# Patient Record
Sex: FEMALE | Race: BLACK OR AFRICAN AMERICAN | Employment: UNEMPLOYED | URBAN - METROPOLITAN AREA
[De-identification: names, ages, dates, MRNs, and addresses within clinical notes are randomized per-mention and may not be internally consistent; named-entity substitution may affect disease eponyms.]

---

## 2017-04-04 ENCOUNTER — HOSPITAL ENCOUNTER (EMERGENCY)
Facility: HOSPITAL | Age: 22
Discharge: HOME | End: 2017-04-05
Attending: EMERGENCY MEDICINE
Payer: MEDICAID

## 2017-04-04 VITALS
DIASTOLIC BLOOD PRESSURE: 58 MMHG | BODY MASS INDEX: 38.07 KG/M2 | TEMPERATURE: 98 F | RESPIRATION RATE: 18 BRPM | HEIGHT: 64 IN | WEIGHT: 223 LBS | HEART RATE: 67 BPM | OXYGEN SATURATION: 100 % | SYSTOLIC BLOOD PRESSURE: 98 MMHG

## 2017-04-04 DIAGNOSIS — R10.13 ABDOMINAL PAIN, EPIGASTRIC: Primary | ICD-10-CM

## 2017-04-04 PROCEDURE — 96361 HYDRATE IV INFUSION ADD-ON: CPT

## 2017-04-04 PROCEDURE — 80076 HEPATIC FUNCTION PANEL: CPT | Performed by: EMERGENCY MEDICINE

## 2017-04-04 PROCEDURE — 85025 COMPLETE CBC W/AUTO DIFF WBC: CPT | Performed by: EMERGENCY MEDICINE

## 2017-04-04 PROCEDURE — 81001 URINALYSIS AUTO W/SCOPE: CPT | Performed by: EMERGENCY MEDICINE

## 2017-04-04 PROCEDURE — 80048 BASIC METABOLIC PNL TOTAL CA: CPT | Performed by: EMERGENCY MEDICINE

## 2017-04-04 PROCEDURE — 83690 ASSAY OF LIPASE: CPT | Performed by: EMERGENCY MEDICINE

## 2017-04-04 PROCEDURE — 81025 URINE PREGNANCY TEST: CPT

## 2017-04-04 PROCEDURE — 99284 EMERGENCY DEPT VISIT MOD MDM: CPT

## 2017-04-04 PROCEDURE — 96374 THER/PROPH/DIAG INJ IV PUSH: CPT

## 2017-04-04 RX ORDER — ONDANSETRON 2 MG/ML
4 INJECTION INTRAMUSCULAR; INTRAVENOUS ONCE
Status: COMPLETED | OUTPATIENT
Start: 2017-04-04 | End: 2017-04-04

## 2017-04-04 RX ORDER — MAGNESIUM HYDROXIDE/ALUMINUM HYDROXICE/SIMETHICONE 120; 1200; 1200 MG/30ML; MG/30ML; MG/30ML
30 SUSPENSION ORAL ONCE
Status: COMPLETED | OUTPATIENT
Start: 2017-04-04 | End: 2017-04-04

## 2017-04-04 RX ORDER — KETOROLAC TROMETHAMINE 30 MG/ML
30 INJECTION, SOLUTION INTRAMUSCULAR; INTRAVENOUS ONCE
Status: DISCONTINUED | OUTPATIENT
Start: 2017-04-04 | End: 2017-04-04

## 2017-04-04 RX ORDER — ONDANSETRON 2 MG/ML
4 INJECTION INTRAMUSCULAR; INTRAVENOUS ONCE
Status: DISCONTINUED | OUTPATIENT
Start: 2017-04-04 | End: 2017-04-04

## 2017-04-05 RX ORDER — RANITIDINE 150 MG/1
150 TABLET ORAL EVERY 12 HOURS
Qty: 60 TABLET | Refills: 0 | Status: SHIPPED | OUTPATIENT
Start: 2017-04-05 | End: 2017-05-05

## 2017-04-05 NOTE — ED PROVIDER NOTES
Patient Seen in: [facility] AND [facility] Emergency Department    History   Patient presents with:  Stomach Pain    Stated Complaint: stomach pain since september of last year    HPI    The patient is a 57-year-old female with a history of HIV who presents with oriented to person, place, and time. She appears well-developed and well-nourished. HENT:   Head: Normocephalic and atraumatic.    Mouth/Throat: Oropharynx is clear and moist and mucous membranes are normal. No oropharyngeal exudate, posterior oropharynge Status                     ---------                               -----------         ------                     CBC W/ DIFFERENTIAL[[id number]]          Abnormal            Final result                 Please view results for these tests on the in

## 2017-04-17 ENCOUNTER — HOSPITAL ENCOUNTER (EMERGENCY)
Facility: HOSPITAL | Age: 22
Discharge: HOME | End: 2017-04-17
Payer: MEDICAID

## 2017-04-17 ENCOUNTER — APPOINTMENT (OUTPATIENT)
Dept: CT IMAGING | Facility: HOSPITAL | Age: 22
End: 2017-04-17
Attending: NURSE PRACTITIONER
Payer: MEDICAID

## 2017-04-17 VITALS
SYSTOLIC BLOOD PRESSURE: 103 MMHG | HEART RATE: 61 BPM | BODY MASS INDEX: 38.07 KG/M2 | TEMPERATURE: 97 F | DIASTOLIC BLOOD PRESSURE: 47 MMHG | WEIGHT: 223 LBS | RESPIRATION RATE: 18 BRPM | HEIGHT: 64 IN | OXYGEN SATURATION: 100 %

## 2017-04-17 DIAGNOSIS — M54.50 ACUTE LEFT-SIDED LOW BACK PAIN WITHOUT SCIATICA: ICD-10-CM

## 2017-04-17 DIAGNOSIS — R10.9 LEFT FLANK PAIN: Primary | ICD-10-CM

## 2017-04-17 PROCEDURE — 81025 URINE PREGNANCY TEST: CPT

## 2017-04-17 PROCEDURE — 74176 CT ABD & PELVIS W/O CONTRAST: CPT

## 2017-04-17 PROCEDURE — 96361 HYDRATE IV INFUSION ADD-ON: CPT

## 2017-04-17 PROCEDURE — 96374 THER/PROPH/DIAG INJ IV PUSH: CPT

## 2017-04-17 PROCEDURE — 99284 EMERGENCY DEPT VISIT MOD MDM: CPT

## 2017-04-17 PROCEDURE — 80048 BASIC METABOLIC PNL TOTAL CA: CPT | Performed by: NURSE PRACTITIONER

## 2017-04-17 PROCEDURE — 85025 COMPLETE CBC W/AUTO DIFF WBC: CPT | Performed by: NURSE PRACTITIONER

## 2017-04-17 PROCEDURE — 81001 URINALYSIS AUTO W/SCOPE: CPT

## 2017-04-17 RX ORDER — KETOROLAC TROMETHAMINE 30 MG/ML
30 INJECTION, SOLUTION INTRAMUSCULAR; INTRAVENOUS ONCE
Status: COMPLETED | OUTPATIENT
Start: 2017-04-17 | End: 2017-04-17

## 2017-04-17 NOTE — ED NOTES
Pt presents with lower abd pain and n/d. Pt reports diarrhea is chronic. Pt states, \"I have an appointment for my stomach next week\". Pt reports increased urination. Pt denies hematuria or dysuria. Pt's abd is soft.  Pt reports is able to tolerate PO food

## 2017-04-17 NOTE — ED PROVIDER NOTES
Patient Seen in: [facility] Emergency Department    History   Patient presents with:  Abdomen/Flank Pain (GI/)    Stated Complaint: back pain     HPI    58-year-old female, with history of HIV, depression and chronic diarrhea presents to the ana person, place, and time. She appears well-developed and well-nourished. HENT:   Head: Normocephalic and atraumatic. Eyes: Conjunctivae and EOM are normal. Pupils are equal, round, and reactive to light. Neck: Normal range of motion. Neck supple.    Ca IV,NO ORAL)(CPT=74176) (Final result) Result time: 04/17/17 18:36:26     Final result by Liset Clifton MD (04/17/17 18:36:26)     Impression:     CONCLUSION:   1.  No urinary stones or obstructive uropathy.   2.  Approximate 5 cm diameter region of subacute

## 2017-05-23 ENCOUNTER — HOSPITAL ENCOUNTER (EMERGENCY)
Facility: HOSPITAL | Age: 22
Discharge: HOME | End: 2017-05-23
Attending: EMERGENCY MEDICINE
Payer: MEDICAID

## 2017-05-23 ENCOUNTER — APPOINTMENT (OUTPATIENT)
Dept: CT IMAGING | Facility: HOSPITAL | Age: 22
End: 2017-05-23
Attending: EMERGENCY MEDICINE
Payer: MEDICAID

## 2017-05-23 VITALS
BODY MASS INDEX: 38.24 KG/M2 | TEMPERATURE: 98 F | WEIGHT: 224 LBS | SYSTOLIC BLOOD PRESSURE: 132 MMHG | OXYGEN SATURATION: 100 % | HEIGHT: 64 IN | DIASTOLIC BLOOD PRESSURE: 74 MMHG | HEART RATE: 87 BPM | RESPIRATION RATE: 18 BRPM

## 2017-05-23 DIAGNOSIS — R11.0 NAUSEA: ICD-10-CM

## 2017-05-23 DIAGNOSIS — R10.9 ABDOMINAL PAIN OF UNKNOWN ETIOLOGY: Primary | ICD-10-CM

## 2017-05-23 PROCEDURE — 36415 COLL VENOUS BLD VENIPUNCTURE: CPT

## 2017-05-23 PROCEDURE — 99284 EMERGENCY DEPT VISIT MOD MDM: CPT

## 2017-05-23 PROCEDURE — 81001 URINALYSIS AUTO W/SCOPE: CPT | Performed by: EMERGENCY MEDICINE

## 2017-05-23 PROCEDURE — 81025 URINE PREGNANCY TEST: CPT

## 2017-05-23 PROCEDURE — 80053 COMPREHEN METABOLIC PANEL: CPT | Performed by: EMERGENCY MEDICINE

## 2017-05-23 PROCEDURE — 74176 CT ABD & PELVIS W/O CONTRAST: CPT | Performed by: EMERGENCY MEDICINE

## 2017-05-23 PROCEDURE — 85025 COMPLETE CBC W/AUTO DIFF WBC: CPT | Performed by: EMERGENCY MEDICINE

## 2017-05-23 RX ORDER — ONDANSETRON 4 MG/1
4 TABLET, ORALLY DISINTEGRATING ORAL EVERY 4 HOURS PRN
Qty: 15 TABLET | Refills: 0 | Status: SHIPPED | OUTPATIENT
Start: 2017-05-23

## 2017-05-23 RX ORDER — ONDANSETRON 4 MG/1
4 TABLET, ORALLY DISINTEGRATING ORAL ONCE
Status: COMPLETED | OUTPATIENT
Start: 2017-05-23 | End: 2017-05-23

## 2017-05-23 RX ORDER — RANITIDINE 150 MG/1
150 TABLET ORAL EVERY 12 HOURS
Qty: 60 TABLET | Refills: 0 | Status: SHIPPED | OUTPATIENT
Start: 2017-05-23 | End: 2017-06-02

## 2017-05-23 RX ORDER — ACETAMINOPHEN 500 MG
1000 TABLET ORAL ONCE
Status: COMPLETED | OUTPATIENT
Start: 2017-05-23 | End: 2017-05-23

## 2017-05-23 NOTE — ED INITIAL ASSESSMENT (HPI)
Pt c/o lower abdominal pain for past two weeks. Pt states she missed her last period, 1 week late. States she has been feeling nauseous as well. Hx miscarriage, two other children at home. Denies fever, +vomiting. Low appetite. Pain is 6/10. Hx HIV.

## 2017-05-24 NOTE — ED NOTES
Pt to ER with c/o left to mid abdominal pain over last several weeks. +tender to palpation. Pt states normal BM earlier today. Pt states LMP 4-17-17. Pt denies dysuria or hematuria. +nausea and vomiting. Pt denies diarrhea. Pt denies chest pain or sob.  Luis Felipe Perez

## 2017-05-26 NOTE — ED PROVIDER NOTES
Patient Seen in: [facility] AND [facility] Emergency Department    History   Patient presents with:  Abdomen/Flank Pain (GI/)    Stated Complaint: stomach pains    HPI    Patient complains of llq abdominal pain that began 2 weeks ago. Hx hiv under treatment. there are no retractions, lungs are clear to auscultation  Cardiovascular: regular rate and rhythm    Gastrointestinal: soft, obese, tender llq, no guarding  Neurological: II-XII grossly intact  no focal deficits  Skin: warm and dry, no rashes.   Musculoske fat containing umbilical hernia. 3.  T10 butterfly vertebral body.               Disposition and Plan     Clinical Impression:  Abdominal pain of unknown etiology  (primary encounter diagnosis)  Nausea    Disposition:  Discharge    Follow-up:  Lionel Gregorio

## 2017-07-04 ENCOUNTER — HOSPITAL (OUTPATIENT)
Dept: OTHER | Age: 22
End: 2017-07-04
Attending: EMERGENCY MEDICINE

## 2017-07-04 LAB
ALBUMIN SERPL-MCNC: 3.9 GM/DL (ref 3.6–5.1)
ALP SERPL-CCNC: 78 UNIT/L (ref 45–117)
ALT SERPL-CCNC: 25 UNIT/L
AMORPH SED URNS QL MICRO: ABNORMAL
ANALYZER ANC (IANC): ABNORMAL
ANION GAP SERPL CALC-SCNC: 12 MMOL/L (ref 10–20)
APPEARANCE UR: ABNORMAL
AST SERPL-CCNC: 23 UNIT/L
BACTERIA #/AREA URNS HPF: ABNORMAL /HPF
BASOPHILS # BLD: 0 THOUSAND/MCL (ref 0–0.3)
BASOPHILS NFR BLD: 0 %
BILIRUB CONJ SERPL-MCNC: 0.1 MG/DL (ref 0–0.2)
BILIRUB SERPL-MCNC: 0.3 MG/DL (ref 0.2–1)
BILIRUB UR QL: NEGATIVE
BUN SERPL-MCNC: 13 MG/DL (ref 6–20)
BUN/CREAT SERPL: 16 (ref 7–25)
CALCIUM SERPL-MCNC: 9.2 MG/DL (ref 8.4–10.2)
CAOX CRY URNS QL MICRO: ABNORMAL
CHLORIDE: 105 MMOL/L (ref 98–107)
CO2 SERPL-SCNC: 26 MMOL/L (ref 21–32)
COLOR UR: YELLOW
CREAT SERPL-MCNC: 0.82 MG/DL (ref 0.51–0.95)
DIFFERENTIAL METHOD BLD: ABNORMAL
EOSINOPHIL # BLD: 0 THOUSAND/MCL (ref 0.1–0.5)
EOSINOPHIL NFR BLD: 1 %
EPITH CASTS #/AREA URNS LPF: ABNORMAL /[LPF]
ERYTHROCYTE [DISTWIDTH] IN BLOOD: 16.9 % (ref 11–15)
FATTY CASTS #/AREA URNS LPF: ABNORMAL /[LPF]
GLUCOSE SERPL-MCNC: 119 MG/DL (ref 65–99)
GLUCOSE UR-MCNC: NEGATIVE MG/DL
GRAN CASTS #/AREA URNS LPF: ABNORMAL /[LPF]
HEMATOCRIT: 37.8 % (ref 36–46.5)
HGB BLD-MCNC: 12.4 GM/DL (ref 12–15.5)
HGB UR QL: ABNORMAL
HYALINE CASTS #/AREA URNS LPF: ABNORMAL /LPF (ref 0–5)
KETONES UR-MCNC: ABNORMAL MG/DL
LEUKOCYTE ESTERASE UR QL STRIP: NEGATIVE
LIPASE SERPL-CCNC: 157 UNIT/L (ref 73–393)
LYMPHOCYTES # BLD: 0.9 THOUSAND/MCL (ref 1–4.8)
LYMPHOCYTES NFR BLD: 19 %
MCH RBC QN AUTO: 26.1 PG (ref 26–34)
MCHC RBC AUTO-ENTMCNC: 32.8 GM/DL (ref 32–36.5)
MCV RBC AUTO: 79.4 FL (ref 78–100)
MIXED CELL CASTS #/AREA URNS LPF: ABNORMAL /[LPF]
MONOCYTES # BLD: 0.1 THOUSAND/MCL (ref 0.3–0.9)
MONOCYTES NFR BLD: 3 %
MUCOUS THREADS URNS QL MICRO: PRESENT
NEUTROPHILS # BLD: 3.5 THOUSAND/MCL (ref 1.8–7.7)
NEUTROPHILS NFR BLD: 77 %
NEUTS SEG NFR BLD: ABNORMAL %
NITRITE UR QL: NEGATIVE
PERCENT NRBC: ABNORMAL
PH UR: 7 UNIT (ref 5–7)
PLATELET # BLD: 215 THOUSAND/MCL (ref 140–450)
POTASSIUM SERPL-SCNC: 4 MMOL/L (ref 3.4–5.1)
PROT SERPL-MCNC: 8.8 GM/DL (ref 6.4–8.2)
PROT UR QL: 30 MG/DL
RBC # BLD: 4.76 MILLION/MCL (ref 4–5.2)
RBC #/AREA URNS HPF: >100 /HPF (ref 0–3)
RBC CASTS #/AREA URNS LPF: ABNORMAL /[LPF]
RENAL EPI CELLS #/AREA URNS HPF: ABNORMAL /[HPF]
SODIUM SERPL-SCNC: 139 MMOL/L (ref 135–145)
SP GR UR: >1.03 (ref 1–1.03)
SPECIMEN SOURCE: ABNORMAL
SPERM URNS QL MICRO: ABNORMAL
SQUAMOUS #/AREA URNS HPF: ABNORMAL /HPF (ref 0–5)
T VAGINALIS URNS QL MICRO: ABNORMAL
TRI-PHOS CRY URNS QL MICRO: ABNORMAL
URATE CRY URNS QL MICRO: ABNORMAL
URINE REFLEX: ABNORMAL
URNS CMNT MICRO: ABNORMAL
UROBILINOGEN UR QL: 0.2 MG/DL (ref 0–1)
WAXY CASTS #/AREA URNS LPF: ABNORMAL /[LPF]
WBC # BLD: 4.6 THOUSAND/MCL (ref 4.2–11)
WBC #/AREA URNS HPF: ABNORMAL /HPF (ref 0–5)
WBC CASTS #/AREA URNS LPF: ABNORMAL /[LPF]
YEAST HYPHAE URNS QL MICRO: ABNORMAL
YEAST URNS QL MICRO: ABNORMAL

## 2018-09-16 ENCOUNTER — HOSPITAL ENCOUNTER (EMERGENCY)
Facility: HOSPITAL | Age: 23
Discharge: HOME | End: 2018-09-16
Attending: EMERGENCY MEDICINE
Payer: MEDICAID

## 2018-09-16 VITALS
SYSTOLIC BLOOD PRESSURE: 107 MMHG | DIASTOLIC BLOOD PRESSURE: 52 MMHG | HEART RATE: 74 BPM | HEIGHT: 65 IN | BODY MASS INDEX: 32.82 KG/M2 | TEMPERATURE: 99 F | WEIGHT: 197 LBS | RESPIRATION RATE: 16 BRPM | OXYGEN SATURATION: 99 %

## 2018-09-16 DIAGNOSIS — R10.2 VAGINAL PAIN: Primary | ICD-10-CM

## 2018-09-16 LAB
B-HCG UR QL: NEGATIVE
BILIRUB UR QL: NEGATIVE
CLARITY UR: CLEAR
COLOR UR: YELLOW
GLUCOSE UR-MCNC: NEGATIVE MG/DL
HGB UR QL STRIP.AUTO: NEGATIVE
KETONES UR-MCNC: NEGATIVE MG/DL
LEUKOCYTE ESTERASE UR QL STRIP.AUTO: NEGATIVE
NITRITE UR QL STRIP.AUTO: NEGATIVE
PH UR: 5 [PH] (ref 5–8)
PROT UR-MCNC: NEGATIVE MG/DL
SP GR UR STRIP: 1.03 (ref 1–1.03)
UROBILINOGEN UR STRIP-ACNC: <2
VIT C UR-MCNC: NEGATIVE MG/DL

## 2018-09-16 PROCEDURE — 87491 CHLMYD TRACH DNA AMP PROBE: CPT | Performed by: EMERGENCY MEDICINE

## 2018-09-16 PROCEDURE — 87591 N.GONORRHOEAE DNA AMP PROB: CPT | Performed by: EMERGENCY MEDICINE

## 2018-09-16 PROCEDURE — 81003 URINALYSIS AUTO W/O SCOPE: CPT | Performed by: EMERGENCY MEDICINE

## 2018-09-16 PROCEDURE — 87205 SMEAR GRAM STAIN: CPT | Performed by: EMERGENCY MEDICINE

## 2018-09-16 PROCEDURE — 87808 TRICHOMONAS ASSAY W/OPTIC: CPT | Performed by: EMERGENCY MEDICINE

## 2018-09-16 PROCEDURE — 81025 URINE PREGNANCY TEST: CPT

## 2018-09-16 PROCEDURE — 99284 EMERGENCY DEPT VISIT MOD MDM: CPT

## 2018-09-16 PROCEDURE — 87106 FUNGI IDENTIFICATION YEAST: CPT | Performed by: EMERGENCY MEDICINE

## 2018-09-16 RX ORDER — METRONIDAZOLE 500 MG/1
500 TABLET ORAL 3 TIMES DAILY
Qty: 14 TABLET | Refills: 0 | Status: SHIPPED | OUTPATIENT
Start: 2018-09-16 | End: 2018-09-23

## 2018-09-16 RX ORDER — IBUPROFEN 600 MG/1
600 TABLET ORAL ONCE
Status: COMPLETED | OUTPATIENT
Start: 2018-09-16 | End: 2018-09-16

## 2018-09-17 LAB
C TRACH DNA SPEC QL NAA+PROBE: NEGATIVE
N GONORRHOEA DNA SPEC QL NAA+PROBE: NEGATIVE

## 2018-09-17 NOTE — ED NOTES
Assumed care to this pt who came in ambulatory with steady gait to room 38 from triage with c/o pain on her vagina and lower back pian and pain whenever she pee. pt states taht she was treated for STD 2 weeks ago and was given antibiotics.  Pt states that 2

## 2018-09-17 NOTE — ED PROVIDER NOTES
Patient Seen in: [facility] AND [facility] Emergency Department    History   Patient presents with:  Eval-G (gynecologic)    Stated Complaint: Vaginal Pain x3 weeks, sharp pain when she urinates    HPI    Presents to the emergency department with ongoing vaginal noted above. PSFH elements reviewed from today and agreed except as otherwise stated in HPI.     Physical Exam     ED Triage Vitals [09/16/18 2142]   /68   Pulse 78   Resp 18   Temp 98.8 °F (37.1 °C)   Temp src Oral   SpO2 99 %   O2 Device None (Ro to 3 separate emergency departments recently in the past couple of weeks for the same complaints.     ED Course     Labs Reviewed   EMH POCT PREGNANCY URINE - Normal   URINALYSIS WITH CULTURE REFLEX   CHLAMYDIA/GONOCOCCUS, LORIE   GENITAL VAGINOSIS SCREEN

## 2018-09-18 LAB — TRICHOMONAS SCREEN: NEGATIVE

## 2019-05-02 ENCOUNTER — HOSPITAL ENCOUNTER (EMERGENCY)
Facility: HOSPITAL | Age: 24
Discharge: HOME | End: 2019-05-02
Payer: MEDICAID

## 2019-05-02 VITALS
SYSTOLIC BLOOD PRESSURE: 132 MMHG | DIASTOLIC BLOOD PRESSURE: 70 MMHG | RESPIRATION RATE: 18 BRPM | HEART RATE: 79 BPM | WEIGHT: 197.06 LBS | TEMPERATURE: 98 F | BODY MASS INDEX: 33 KG/M2 | OXYGEN SATURATION: 99 %

## 2019-05-02 DIAGNOSIS — N39.0 URINARY TRACT INFECTION WITHOUT HEMATURIA, SITE UNSPECIFIED: Primary | ICD-10-CM

## 2019-05-02 PROCEDURE — 96365 THER/PROPH/DIAG IV INF INIT: CPT

## 2019-05-02 PROCEDURE — 87186 SC STD MICRODIL/AGAR DIL: CPT | Performed by: NURSE PRACTITIONER

## 2019-05-02 PROCEDURE — 99284 EMERGENCY DEPT VISIT MOD MDM: CPT

## 2019-05-02 PROCEDURE — 81025 URINE PREGNANCY TEST: CPT

## 2019-05-02 PROCEDURE — 96361 HYDRATE IV INFUSION ADD-ON: CPT

## 2019-05-02 PROCEDURE — 81001 URINALYSIS AUTO W/SCOPE: CPT | Performed by: NURSE PRACTITIONER

## 2019-05-02 PROCEDURE — 87086 URINE CULTURE/COLONY COUNT: CPT | Performed by: NURSE PRACTITIONER

## 2019-05-02 PROCEDURE — 87077 CULTURE AEROBIC IDENTIFY: CPT | Performed by: NURSE PRACTITIONER

## 2019-05-02 RX ORDER — CEPHALEXIN 500 MG/1
500 CAPSULE ORAL 4 TIMES DAILY
Qty: 40 CAPSULE | Refills: 0 | Status: SHIPPED | OUTPATIENT
Start: 2019-05-02 | End: 2019-05-12

## 2019-05-02 NOTE — ED PROVIDER NOTES
Patient Seen in: [facility] AND [facility] Emergency Department    History   Patient presents with:  Urinary Symptoms (urologic)    Stated Complaint: UTI    HPI    Patient complains of urinary frequency, urgency and dysuria that began 3 days ago,  Pt is 6 weeks clear  EYES: sclera non icteric, conjunctiva non-injected  NECK: supple,  LUNGS:no resp distress  CARDIO:regular rate  EXTREMITIES: no cyanosis, clubbing or edema  BACK: no CVA tenderness  GI: soft, mild suprapubic tenderness, no guarding      ED Course

## 2019-06-11 ENCOUNTER — HOSPITAL ENCOUNTER (EMERGENCY)
Facility: HOSPITAL | Age: 24
Discharge: HOME | End: 2019-06-11
Attending: EMERGENCY MEDICINE
Payer: MEDICAID

## 2019-06-11 VITALS
BODY MASS INDEX: 33.63 KG/M2 | HEART RATE: 96 BPM | RESPIRATION RATE: 18 BRPM | WEIGHT: 197 LBS | HEIGHT: 64 IN | SYSTOLIC BLOOD PRESSURE: 125 MMHG | OXYGEN SATURATION: 100 % | DIASTOLIC BLOOD PRESSURE: 53 MMHG | TEMPERATURE: 98 F

## 2019-06-11 DIAGNOSIS — K04.7 DENTAL ABSCESS: Primary | ICD-10-CM

## 2019-06-11 PROCEDURE — 99283 EMERGENCY DEPT VISIT LOW MDM: CPT

## 2019-06-11 RX ORDER — AMOXICILLIN 875 MG/1
875 TABLET, COATED ORAL ONCE
Status: COMPLETED | OUTPATIENT
Start: 2019-06-11 | End: 2019-06-11

## 2019-06-11 RX ORDER — AMOXICILLIN 875 MG/1
875 TABLET, COATED ORAL 2 TIMES DAILY
Qty: 10 TABLET | Refills: 0 | Status: SHIPPED | OUTPATIENT
Start: 2019-06-11 | End: 2019-06-16

## 2019-06-11 NOTE — ED INITIAL ASSESSMENT (HPI)
Left sided facial swelling x 2 days, worse today.  Needs tooth removed on bottom left side    Twelve  Weeks pregnant

## 2019-06-11 NOTE — ED NOTES
Patient cleared for discharge by MD. Erlindas with patient. Patient discharge instructions reviewed with patient including when and how to follow up  with healthcare provider and when to seek medical treatment. Medication use and prescriptions reviewed.

## 2019-06-11 NOTE — ED PROVIDER NOTES
Patient Seen in: [facility] AND [facility] Emergency Department    History   Patient presents with:  Swelling Edema (cardiovascular, metabolic)    Stated Complaint: left sided facial swelling    HPI    52-year-old female with past medical history significant for swelling adjacent and swelling in the mandibular and submandibular area with diffuse tenderness to palpation of the area. Eyes: Conjunctivae and EOM are normal. PERRLA  Neck: Normal range of motion. Neck supple. No tracheal deviation present.    CV: s1s2+,

## 2019-08-09 ENCOUNTER — HOSPITAL ENCOUNTER (EMERGENCY)
Facility: HOSPITAL | Age: 24
Discharge: HOME | End: 2019-08-09
Attending: EMERGENCY MEDICINE
Payer: MEDICAID

## 2019-08-09 ENCOUNTER — APPOINTMENT (OUTPATIENT)
Dept: ULTRASOUND IMAGING | Facility: HOSPITAL | Age: 24
End: 2019-08-09
Attending: EMERGENCY MEDICINE
Payer: MEDICAID

## 2019-08-09 VITALS
BODY MASS INDEX: 30.82 KG/M2 | WEIGHT: 185 LBS | OXYGEN SATURATION: 100 % | SYSTOLIC BLOOD PRESSURE: 104 MMHG | HEART RATE: 66 BPM | RESPIRATION RATE: 18 BRPM | DIASTOLIC BLOOD PRESSURE: 64 MMHG | HEIGHT: 65 IN | TEMPERATURE: 98 F

## 2019-08-09 DIAGNOSIS — R60.0 BILATERAL LEG EDEMA: Primary | ICD-10-CM

## 2019-08-09 LAB
ALBUMIN SERPL-MCNC: 2.7 G/DL (ref 3.4–5)
ALBUMIN/GLOB SERPL: 0.7 {RATIO} (ref 1–2)
ALP LIVER SERPL-CCNC: 50 U/L (ref 37–98)
ALT SERPL-CCNC: 16 U/L (ref 13–56)
ANION GAP SERPL CALC-SCNC: 6 MMOL/L (ref 0–18)
APTT PPP: 27.2 SECONDS (ref 23.2–35.3)
AST SERPL-CCNC: 13 U/L (ref 15–37)
BASOPHILS # BLD AUTO: 0.02 X10(3) UL (ref 0–0.2)
BASOPHILS NFR BLD AUTO: 0.3 %
BILIRUB SERPL-MCNC: 0.2 MG/DL (ref 0.1–2)
BILIRUB UR QL: NEGATIVE
BUN BLD-MCNC: 13 MG/DL (ref 7–18)
BUN/CREAT SERPL: 15.7 (ref 10–20)
CALCIUM BLD-MCNC: 8.6 MG/DL (ref 8.5–10.1)
CHLORIDE SERPL-SCNC: 108 MMOL/L (ref 98–112)
CLARITY UR: CLEAR
CO2 SERPL-SCNC: 28 MMOL/L (ref 21–32)
COLOR UR: YELLOW
CREAT BLD-MCNC: 0.83 MG/DL (ref 0.55–1.02)
DEPRECATED RDW RBC AUTO: 48.7 FL (ref 35.1–46.3)
EOSINOPHIL # BLD AUTO: 0.25 X10(3) UL (ref 0–0.7)
EOSINOPHIL NFR BLD AUTO: 3.4 %
ERYTHROCYTE [DISTWIDTH] IN BLOOD BY AUTOMATED COUNT: 14.5 % (ref 11–15)
GLOBULIN PLAS-MCNC: 4 G/DL (ref 2.8–4.4)
GLUCOSE BLD-MCNC: 87 MG/DL (ref 70–99)
GLUCOSE UR-MCNC: NEGATIVE MG/DL
HCT VFR BLD AUTO: 26.3 % (ref 35–48)
HGB BLD-MCNC: 8.4 G/DL (ref 12–16)
IMM GRANULOCYTES # BLD AUTO: 0.02 X10(3) UL (ref 0–1)
IMM GRANULOCYTES NFR BLD: 0.3 %
INR BLD: 1.05 (ref 0.9–1.2)
KETONES UR-MCNC: NEGATIVE MG/DL
LYMPHOCYTES # BLD AUTO: 2.47 X10(3) UL (ref 1–4)
LYMPHOCYTES NFR BLD AUTO: 33.5 %
M PROTEIN MFR SERPL ELPH: 6.7 G/DL (ref 6.4–8.2)
MCH RBC QN AUTO: 30.5 PG (ref 26–34)
MCHC RBC AUTO-ENTMCNC: 31.9 G/DL (ref 31–37)
MCV RBC AUTO: 95.6 FL (ref 80–100)
MONOCYTES # BLD AUTO: 0.31 X10(3) UL (ref 0.1–1)
MONOCYTES NFR BLD AUTO: 4.2 %
NEUTROPHILS # BLD AUTO: 4.3 X10 (3) UL (ref 1.5–7.7)
NEUTROPHILS # BLD AUTO: 4.3 X10(3) UL (ref 1.5–7.7)
NEUTROPHILS NFR BLD AUTO: 58.3 %
NITRITE UR QL STRIP.AUTO: NEGATIVE
OSMOLALITY SERPL CALC.SUM OF ELEC: 293 MOSM/KG (ref 275–295)
PH UR: 5 [PH] (ref 5–8)
PLATELET # BLD AUTO: 217 10(3)UL (ref 150–450)
POTASSIUM SERPL-SCNC: 3.4 MMOL/L (ref 3.5–5.1)
PROT UR-MCNC: 30 MG/DL
PROTHROMBIN TIME: 13.5 SECONDS (ref 11.8–14.5)
RBC # BLD AUTO: 2.75 X10(6)UL (ref 3.8–5.3)
RBC #/AREA URNS AUTO: 5 /HPF
SODIUM SERPL-SCNC: 142 MMOL/L (ref 136–145)
SP GR UR STRIP: 1.03 (ref 1–1.03)
UROBILINOGEN UR STRIP-ACNC: <2
VIT C UR-MCNC: NEGATIVE MG/DL
WBC # BLD AUTO: 7.4 X10(3) UL (ref 4–11)
WBC #/AREA URNS AUTO: 14 /HPF

## 2019-08-09 PROCEDURE — 85730 THROMBOPLASTIN TIME PARTIAL: CPT | Performed by: EMERGENCY MEDICINE

## 2019-08-09 PROCEDURE — 96374 THER/PROPH/DIAG INJ IV PUSH: CPT

## 2019-08-09 PROCEDURE — 80053 COMPREHEN METABOLIC PANEL: CPT | Performed by: EMERGENCY MEDICINE

## 2019-08-09 PROCEDURE — 93970 EXTREMITY STUDY: CPT | Performed by: EMERGENCY MEDICINE

## 2019-08-09 PROCEDURE — 81001 URINALYSIS AUTO W/SCOPE: CPT | Performed by: EMERGENCY MEDICINE

## 2019-08-09 PROCEDURE — 85025 COMPLETE CBC W/AUTO DIFF WBC: CPT | Performed by: EMERGENCY MEDICINE

## 2019-08-09 PROCEDURE — 87086 URINE CULTURE/COLONY COUNT: CPT | Performed by: EMERGENCY MEDICINE

## 2019-08-09 PROCEDURE — 99284 EMERGENCY DEPT VISIT MOD MDM: CPT

## 2019-08-09 PROCEDURE — 85610 PROTHROMBIN TIME: CPT | Performed by: EMERGENCY MEDICINE

## 2019-08-09 RX ORDER — KETOROLAC TROMETHAMINE 30 MG/ML
30 INJECTION, SOLUTION INTRAMUSCULAR; INTRAVENOUS ONCE
Status: COMPLETED | OUTPATIENT
Start: 2019-08-09 | End: 2019-08-09

## 2019-08-10 NOTE — ED INITIAL ASSESSMENT (HPI)
Miscarriage on Monday at [facility], began developing swelling to her BLE from the knees down since Tuesday, worsening today with pain to her BLE as well. Reports difficulty ambulating. +lightheaded, denies cp, sob.

## 2019-08-10 NOTE — ED PROVIDER NOTES
Patient Seen in: [facility] AND [facility] Emergency Department    History   Patient presents with:  Swelling Edema (cardiovascular, metabolic)    Stated Complaint: Bilateral LE Pain&Swelling x3 days    HPI    30year old [id number] with pmh asthma, HIV who had spo SpO2 100%   Breastfeeding? Unknown   BMI 30.79 kg/m²         Physical Exam   Constitutional: She is oriented to person, place, and time. She appears well-developed and well-nourished. She is cooperative. Non-toxic appearance. No distress.    HENT:   Head: RDW-SD 48.7 (*)     All other components within normal limits   PROTHROMBIN TIME (PT) - Normal   PTT, ACTIVATED - Normal   CBC WITH DIFFERENTIAL WITH PLATELET    Narrative:      The following orders were created for panel order CBC WITH DIFFERENTIAL WITH PL encounter diagnosis)    Disposition:  Discharge  8/9/2019 10:59 pm    Follow-up:  Your OB doctor at [place] [address] an appointment as soon as possible for a visit in 3 days          Medications Prescribed:  Current Discharge Medication List

## 2021-10-24 ENCOUNTER — HOSPITAL ENCOUNTER (EMERGENCY)
Facility: HOSPITAL | Age: 26
Discharge: HOME | End: 2021-10-24
Attending: EMERGENCY MEDICINE
Payer: MEDICAID

## 2021-10-24 VITALS
SYSTOLIC BLOOD PRESSURE: 132 MMHG | OXYGEN SATURATION: 100 % | TEMPERATURE: 99 F | RESPIRATION RATE: 21 BRPM | DIASTOLIC BLOOD PRESSURE: 86 MMHG | HEIGHT: 65 IN | BODY MASS INDEX: 33.32 KG/M2 | WEIGHT: 200 LBS | HEART RATE: 120 BPM

## 2021-10-24 DIAGNOSIS — L02.416 ABSCESS OF LEFT LEG: Primary | ICD-10-CM

## 2021-10-24 PROCEDURE — 10060 I&D ABSCESS SIMPLE/SINGLE: CPT

## 2021-10-24 PROCEDURE — 99283 EMERGENCY DEPT VISIT LOW MDM: CPT

## 2021-10-24 RX ORDER — SULFAMETHOXAZOLE AND TRIMETHOPRIM 800; 160 MG/1; MG/1
1 TABLET ORAL 2 TIMES DAILY
Qty: 20 TABLET | Refills: 0 | Status: SHIPPED | OUTPATIENT
Start: 2021-10-24 | End: 2021-11-03

## 2021-10-24 NOTE — ED QUICK NOTES
Pt to ED with c/o redness and swelling to left lateral leg for about 2 weeks. Pt denies fever. MD at bedside for I&D to abscess. Wound care provided. Actions and side effects of Bactrim reviewed. Importance of PCP follow up reviewed.

## 2021-10-25 NOTE — ED PROVIDER NOTES
Patient Seen in: [facility] AND [facility] Emergency Department    History   Patient presents with:  Cellulitis      HPI     Patient presents to the ED complaining of an infection to her left lateral lower leg. She states that she gets abscesses occasionally. Normocephalic and atraumatic. Pulmonary:      Effort: Pulmonary effort is normal. No respiratory distress. Skin:     General: Skin is warm and dry. Comments: 4 cm erythematous patch in the left lateral lower leg. Central induration and pointing. of purulent drainage was expressed. I irrigated and dressed the wound. The patient tolerated the procedure well. The procedure was performed by myself.     Additional verbal instructions and return precautions were discussed with the patient and/or careg

## 2021-12-20 ENCOUNTER — HOSPITAL ENCOUNTER (EMERGENCY)
Facility: HOSPITAL | Age: 26
Discharge: HOME | End: 2021-12-20
Payer: MEDICAID

## 2021-12-20 VITALS
SYSTOLIC BLOOD PRESSURE: 124 MMHG | TEMPERATURE: 100 F | WEIGHT: 200 LBS | DIASTOLIC BLOOD PRESSURE: 61 MMHG | HEART RATE: 66 BPM | BODY MASS INDEX: 33.32 KG/M2 | OXYGEN SATURATION: 100 % | RESPIRATION RATE: 18 BRPM | HEIGHT: 65 IN

## 2021-12-20 DIAGNOSIS — K52.9 GASTROENTERITIS: Primary | ICD-10-CM

## 2021-12-20 PROCEDURE — 99284 EMERGENCY DEPT VISIT MOD MDM: CPT

## 2021-12-20 PROCEDURE — 96374 THER/PROPH/DIAG INJ IV PUSH: CPT

## 2021-12-20 PROCEDURE — 80048 BASIC METABOLIC PNL TOTAL CA: CPT

## 2021-12-20 PROCEDURE — 81001 URINALYSIS AUTO W/SCOPE: CPT | Performed by: NURSE PRACTITIONER

## 2021-12-20 PROCEDURE — 85025 COMPLETE CBC W/AUTO DIFF WBC: CPT

## 2021-12-20 PROCEDURE — 81025 URINE PREGNANCY TEST: CPT

## 2021-12-20 PROCEDURE — 96361 HYDRATE IV INFUSION ADD-ON: CPT

## 2021-12-20 PROCEDURE — 96375 TX/PRO/DX INJ NEW DRUG ADDON: CPT

## 2021-12-20 PROCEDURE — 96372 THER/PROPH/DIAG INJ SC/IM: CPT

## 2021-12-20 RX ORDER — ONDANSETRON 2 MG/ML
4 INJECTION INTRAMUSCULAR; INTRAVENOUS ONCE
Status: COMPLETED | OUTPATIENT
Start: 2021-12-20 | End: 2021-12-20

## 2021-12-20 RX ORDER — DICYCLOMINE HYDROCHLORIDE 10 MG/ML
20 INJECTION INTRAMUSCULAR ONCE
Status: COMPLETED | OUTPATIENT
Start: 2021-12-20 | End: 2021-12-20

## 2021-12-20 RX ORDER — ONDANSETRON 4 MG/1
4 TABLET, ORALLY DISINTEGRATING ORAL EVERY 4 HOURS PRN
Qty: 10 TABLET | Refills: 0 | Status: SHIPPED | OUTPATIENT
Start: 2021-12-20 | End: 2021-12-27

## 2021-12-20 NOTE — ED PROVIDER NOTES
Patient Seen in: [facility] Emergency Department      History   Patient presents with:  Nausea/Vomiting/Diarrhea  Abdomen/Flank Pain    Stated Complaint: \"i think i have food posioning\"    Subjective:   Patient here with c/o generalized abd pain Appearance: She is well-developed. HENT:      Head: Normocephalic and atraumatic. Cardiovascular:      Rate and Rhythm: Normal rate and regular rhythm. Heart sounds: Normal heart sounds.    Pulmonary:      Effort: Pulmonary effort is normal.      B -----------         ------                     CBC W/ DIFFERENTIAL[[id number]]          Abnormal            Final result                 Please view results for these tests on the individual orders.                  MDM      -27year old female with hx and e

## 2022-10-22 ENCOUNTER — HOSPITAL ENCOUNTER (EMERGENCY)
Age: 27
Discharge: HOME | End: 2022-10-22
Attending: EMERGENCY MEDICINE

## 2022-10-22 ENCOUNTER — APPOINTMENT (OUTPATIENT)
Dept: GENERAL RADIOLOGY | Age: 27
End: 2022-10-22
Attending: PHYSICIAN ASSISTANT

## 2022-10-22 VITALS
SYSTOLIC BLOOD PRESSURE: 137 MMHG | BODY MASS INDEX: 35.67 KG/M2 | DIASTOLIC BLOOD PRESSURE: 70 MMHG | HEIGHT: 65 IN | OXYGEN SATURATION: 100 % | HEART RATE: 76 BPM | RESPIRATION RATE: 16 BRPM | WEIGHT: 214.07 LBS | TEMPERATURE: 97 F

## 2022-10-22 DIAGNOSIS — S91.114A LACERATION OF LESSER TOE OF RIGHT FOOT WITHOUT FOREIGN BODY PRESENT OR DAMAGE TO NAIL, INITIAL ENCOUNTER: ICD-10-CM

## 2022-10-22 DIAGNOSIS — M25.572 ACUTE LEFT ANKLE PAIN: ICD-10-CM
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
## 2022-10-22 DIAGNOSIS — R93.89 ABNORMAL X-RAY: ICD-10-CM

## 2022-10-22 DIAGNOSIS — S90.511A ABRASION, RIGHT ANKLE, INITIAL ENCOUNTER: ICD-10-CM

## 2022-10-22 PROCEDURE — 73630 X-RAY EXAM OF FOOT: CPT

## 2022-10-22 PROCEDURE — 73630 X-RAY EXAM OF FOOT: CPT | Performed by: RADIOLOGY

## 2022-10-22 PROCEDURE — 73610 X-RAY EXAM OF ANKLE: CPT | Performed by: RADIOLOGY

## 2022-10-22 PROCEDURE — 10002800 HB RX 250 W HCPCS: Performed by: PHYSICIAN ASSISTANT

## 2022-10-22 PROCEDURE — 90471 IMMUNIZATION ADMIN: CPT | Performed by: PHYSICIAN ASSISTANT

## 2022-10-22 PROCEDURE — 10004651 HB RX, NO CHARGE ITEM: Performed by: PHYSICIAN ASSISTANT

## 2022-10-22 PROCEDURE — 99283 EMERGENCY DEPT VISIT LOW MDM: CPT

## 2022-10-22 PROCEDURE — 99284 EMERGENCY DEPT VISIT MOD MDM: CPT | Performed by: PHYSICIAN ASSISTANT

## 2022-10-22 PROCEDURE — 73610 X-RAY EXAM OF ANKLE: CPT

## 2022-10-22 PROCEDURE — 90715 TDAP VACCINE 7 YRS/> IM: CPT | Performed by: PHYSICIAN ASSISTANT

## 2022-10-22 PROCEDURE — 73660 X-RAY EXAM OF TOE(S): CPT | Performed by: RADIOLOGY

## 2022-10-22 PROCEDURE — 73660 X-RAY EXAM OF TOE(S): CPT

## 2022-10-22 RX ORDER — ACETAMINOPHEN 325 MG/1
650 TABLET ORAL ONCE
Status: COMPLETED | OUTPATIENT
Start: 2022-10-22 | End: 2022-10-22

## 2022-10-22 RX ORDER — CEPHALEXIN 500 MG/1
500 CAPSULE ORAL 4 TIMES DAILY
Qty: 40 CAPSULE | Refills: 0 | Status: SHIPPED | OUTPATIENT
Start: 2022-10-22 | End: 2022-11-01

## 2022-10-22 RX ADMIN — TETANUS TOXOID, REDUCED DIPHTHERIA TOXOID AND ACELLULAR PERTUSSIS VACCINE, ADSORBED 0.5 ML: 5; 2.5; 8; 8; 2.5 SUSPENSION INTRAMUSCULAR at 12:13

## 2022-10-22 RX ADMIN — ACETAMINOPHEN 650 MG: 325 TABLET ORAL at 12:12

## 2022-10-22 ASSESSMENT — PAIN DESCRIPTION - PAIN TYPE: TYPE: ACUTE PAIN

## 2022-10-22 ASSESSMENT — PAIN SCALES - GENERAL: PAINLEVEL_OUTOF10: 8

## 2023-04-11 ENCOUNTER — APPOINTMENT (OUTPATIENT)
Dept: GENERAL RADIOLOGY | Facility: HOSPITAL | Age: 28
End: 2023-04-11
Payer: MEDICAID

## 2023-04-11 ENCOUNTER — HOSPITAL ENCOUNTER (EMERGENCY)
Facility: HOSPITAL | Age: 28
Discharge: HOME | End: 2023-04-11
Attending: EMERGENCY MEDICINE
Payer: MEDICAID

## 2023-04-11 VITALS
TEMPERATURE: 99 F | HEART RATE: 77 BPM | HEIGHT: 66 IN | WEIGHT: 200 LBS | SYSTOLIC BLOOD PRESSURE: 115 MMHG | BODY MASS INDEX: 32.14 KG/M2 | OXYGEN SATURATION: 100 % | RESPIRATION RATE: 16 BRPM | DIASTOLIC BLOOD PRESSURE: 65 MMHG

## 2023-04-11 DIAGNOSIS — S80.02XA CONTUSION OF LEFT KNEE, INITIAL ENCOUNTER: ICD-10-CM

## 2023-04-11 DIAGNOSIS — S93.402A MILD SPRAIN OF LEFT ANKLE, INITIAL ENCOUNTER: Primary | ICD-10-CM

## 2023-04-11 PROCEDURE — 73560 X-RAY EXAM OF KNEE 1 OR 2: CPT | Performed by: EMERGENCY MEDICINE

## 2023-04-11 PROCEDURE — 99284 EMERGENCY DEPT VISIT MOD MDM: CPT

## 2023-04-11 PROCEDURE — 73610 X-RAY EXAM OF ANKLE: CPT | Performed by: EMERGENCY MEDICINE

## 2023-04-11 PROCEDURE — 99283 EMERGENCY DEPT VISIT LOW MDM: CPT

## 2023-04-11 NOTE — DISCHARGE INSTRUCTIONS
Please take acetaminophen (also called Tylenol) for your pain. You can take 1 g every 4-6 hours. Do not take more than 3 g over the course of a day from all medications you take; it is a common ingredient and other medications (such as Norco, Vicodin, Tylenol 3, etc.), so please read the labels carefully. If you are only taking Tylenol, doses every 6 hours. If you have a history of liver disease you should not take Tylenol as it can worsen your liver function. As discussed, you should take ibuprofen (also called Advil or Motrin), or naproxen (also called Aleve) for your pain. If you are taking ibuprofen, you can take 600 mg every 6 hours, or 800 mg every 8 hours. If you are taking naproxen, you can take 500 mg every 12 hours. Do not take more than this amount as it can cause kidney problems or bleeding into your stomach. Do not take these medications at the same time as it can increase your risk for the side effects. If you have a history of heart problems or have had uncontrolled blood pressure for a significant period of time, he should not take these medications as it can increase your risk for heart attack or stroke.

## 2023-04-11 NOTE — ED INITIAL ASSESSMENT (HPI)
Patient here left knee and ankle pain after falling down the stairs last night. Ankle noted to be swollen, + PMS.

## 2023-09-18 ENCOUNTER — HOSPITAL ENCOUNTER (EMERGENCY)
Facility: HOSPITAL | Age: 28
Discharge: HOME | End: 2023-09-19
Attending: STUDENT IN AN ORGANIZED HEALTH CARE EDUCATION/TRAINING PROGRAM
Payer: MEDICAID

## 2023-09-18 DIAGNOSIS — J02.0 STREP PHARYNGITIS: Primary | ICD-10-CM

## 2023-09-18 LAB — SARS-COV-2 RNA RESP QL NAA+PROBE: NOT DETECTED

## 2023-09-18 PROCEDURE — 99284 EMERGENCY DEPT VISIT MOD MDM: CPT

## 2023-09-18 PROCEDURE — 99283 EMERGENCY DEPT VISIT LOW MDM: CPT

## 2023-09-18 RX ORDER — IBUPROFEN 600 MG/1
600 TABLET ORAL ONCE
Status: COMPLETED | OUTPATIENT
Start: 2023-09-18 | End: 2023-09-18

## 2023-09-19 VITALS
RESPIRATION RATE: 16 BRPM | TEMPERATURE: 99 F | DIASTOLIC BLOOD PRESSURE: 79 MMHG | SYSTOLIC BLOOD PRESSURE: 123 MMHG | BODY MASS INDEX: 36.65 KG/M2 | OXYGEN SATURATION: 99 % | HEIGHT: 65 IN | WEIGHT: 220 LBS | HEART RATE: 98 BPM

## 2023-09-19 LAB — S PYO AG THROAT QL: POSITIVE

## 2023-09-19 PROCEDURE — 87880 STREP A ASSAY W/OPTIC: CPT

## 2023-09-19 RX ORDER — AMOXICILLIN 500 MG/1
500 TABLET, FILM COATED ORAL 2 TIMES DAILY
Qty: 20 TABLET | Refills: 0 | Status: SHIPPED | OUTPATIENT
Start: 2023-09-19 | End: 2023-09-29

## 2023-09-19 NOTE — ED INITIAL ASSESSMENT (HPI)
Pt to ED with c/o fever, headache, body aches, generalized abdominal, dry throat, and nausea x10 days.

## (undated) NOTE — ED AVS SNAPSHOT
Victor Hugo Neves   MRN: [id number]    Department:  [facility] Emergency Department   Date of Visit:  8/9/2019           Disclosure     Insurance plans vary and the physician(s) referred by the ER may not be covered by your plan.  Please contact CARE PHYSICIAN AT ONCE OR RETURN IMMEDIATELY TO THE EMERGENCY DEPARTMENT. If you have been prescribed any medication(s), please fill your prescription right away and begin taking the medication(s) as directed.   If you believe that any of the medications

## (undated) NOTE — ED AVS SNAPSHOT
Rose Mcclelland   MRN: [id number]    Department:  [facility] Emergency Department   Date of Visit:  6/11/2019           Disclosure     Insurance plans vary and the physician(s) referred by the ER may not be covered by your plan.  Please contact CARE PHYSICIAN AT ONCE OR RETURN IMMEDIATELY TO THE EMERGENCY DEPARTMENT. If you have been prescribed any medication(s), please fill your prescription right away and begin taking the medication(s) as directed.   If you believe that any of the medications

## (undated) NOTE — ED AVS SNAPSHOT
[facility] Emergency Department    [address]     [address] [place] [address]    Phone:  [phone]    Fax:  [phone]           Guero Ulloa   MRN: [id number]    Department:  [facility] Emergency Department   Date of Visit:  4/1 Si tiene problemas para programar jose alejandro chris de seguimiento según lo indicado, llame al encargado de hi al [phone]. It is our goal to assure that you are completely satisfied with every aspect of your visit today.   In an effort to constantly impr list to your next doctor's appointment. Any imaging studies and labs completed today can be reviewed in your MyChart account. You may have had testing done that requires us to contact you. Please make sure we have your correct phone number on file. Sign up for [employer], your secure online medical record. [employer] will allow you to access patient instructions from your recent visit,  view other health information, and more. To sign up or find more information, go to [email or web address]

## (undated) NOTE — ED AVS SNAPSHOT
Guero Ulloa   MRN: [id number]    Department:  [facility] Emergency Department   Date of Visit:  5/2/2019           Disclosure     Insurance plans vary and the physician(s) referred by the ER may not be covered by your plan.  Please contact CARE PHYSICIAN AT ONCE OR RETURN IMMEDIATELY TO THE EMERGENCY DEPARTMENT. If you have been prescribed any medication(s), please fill your prescription right away and begin taking the medication(s) as directed.   If you believe that any of the medications

## (undated) NOTE — ED AVS SNAPSHOT
[facility] Emergency Department    [address]     [place] [address]    Phone:  [phone]    Fax:  [phone]           Terri Iverson   MRN: [id number]    Department:  [facility] Emergency Department   Date of Visit:  4/4 vomiting. Keep appointment with gastroenterologist as scheduled. Take ranitidine as instructed.   Avoid spicy foods caffeine alcohol and tobacco.    Discharge References/Attachments     EPIGASTRIC PAIN (UNCERTAIN CAUSE) (ENGLISH)      Disclosure     Insur visiting [email or web address].    IF THERE IS ANY CHANGE OR WORSENING OF YOUR CONDITION, CALL YOUR PRIMARY CARE PHYSICIAN AT ONCE OR RETURN IMMEDIATELY TO THE EMERGENCY DEPARTMENT.     If you have been prescribed any medication(s), please fill your prescription - If you have concerns related to behavioral health issues or thoughts of harming yourself, contact [address] [place] [address] at [phone].     - If you don’t have insurance, Aidee Reyna has partnered with Patient [employer] Yung

## (undated) NOTE — ED AVS SNAPSHOT
[facility] Emergency Department    [address]     [place] [address]    Phone:  [phone]    Fax:  [phone]           Guero Ulloa   MRN: [id number]    Department:  [facility] Emergency Department   Date of Visit:  5/2 and Class Registration line at [phone] or find a doctor online by visiting [email or web address].    IF THERE IS ANY CHANGE OR WORSENING OF YOUR CONDITION, CALL YOUR PRIMARY CARE PHYSICIAN AT ONCE OR RETURN IMMEDIATELY TO [address] [place] [address].     If

## (undated) NOTE — ED AVS SNAPSHOT
[facility] Emergency Department    [address] [place] [address]    Phone:  [phone]    Fax:  [phone]           Patricia Esteban   MRN: [id number]    Department:  [facility] Emergency Department   Date of Visit:  4/1 and Class Registration line at [phone] or find a doctor online by visiting [email or web address].    IF THERE IS ANY CHANGE OR WORSENING OF YOUR CONDITION, CALL YOUR PRIMARY CARE PHYSICIAN AT ONCE OR RETURN IMMEDIATELY TO [address] [place] [address].     If

## (undated) NOTE — ED AVS SNAPSHOT
[facility] Emergency Department    [address]     [place] [address]    Phone:  [phone]    Fax:  [phone]           Jayson Scottrachel   MRN: [id number]    Department:  [facility] Emergency Department   Date of Visit:  4/4 and Class Registration line at [phone] or find a doctor online by visiting [email or web address].    IF THERE IS ANY CHANGE OR WORSENING OF YOUR CONDITION, CALL YOUR PRIMARY CARE PHYSICIAN AT ONCE OR RETURN IMMEDIATELY TO [address] [place] [address].     If

## (undated) NOTE — ED AVS SNAPSHOT
Nina Ponce   MRN: [id number]    Department:  [facility] Emergency Department   Date of Visit:  9/16/2018           Disclosure     Insurance plans vary and the physician(s) referred by the ER may not be covered by your plan.  Please contact CARE PHYSICIAN AT ONCE OR RETURN IMMEDIATELY TO THE EMERGENCY DEPARTMENT. If you have been prescribed any medication(s), please fill your prescription right away and begin taking the medication(s) as directed.   If you believe that any of the medications

## (undated) NOTE — ED AVS SNAPSHOT
[facility] Emergency Department    [address]     [place] [address]    Phone:  [phone]    Fax:  [phone]           Belkis Bhakta   MRN: [id number]    Department:  [facility] Emergency Department   Date of Visit:  5/2 Insurance plans vary and the physician(s) referred by the ER may not be covered by your plan. Please contact your insurance company to determine coverage and benefits available for follow-up care and referrals.       If you have difficulty scheduling your prescription right away and begin taking the medication(s) as directed.   If you believe that any of the medications or instructions on this list is different from what your Primary Care doctor has instructed you - please continue to take your medications a Patient [address] to help you get signed up for insurance coverage. Patient [address] is a Federal Navigator program that can help with your Affordable Care Act coverage, as well as all types of Medicaid plans.   To get signed up and covere